# Patient Record
Sex: FEMALE | Race: WHITE | NOT HISPANIC OR LATINO | Employment: OTHER | URBAN - METROPOLITAN AREA
[De-identification: names, ages, dates, MRNs, and addresses within clinical notes are randomized per-mention and may not be internally consistent; named-entity substitution may affect disease eponyms.]

---

## 2020-05-14 ENCOUNTER — VIRTUAL VISIT (OUTPATIENT)
Dept: PHYSICAL THERAPY | Facility: CLINIC | Age: 60
End: 2020-05-14
Payer: COMMERCIAL

## 2020-05-14 DIAGNOSIS — M75.02 ADHESIVE CAPSULITIS OF LEFT SHOULDER: Primary | ICD-10-CM

## 2020-05-14 PROCEDURE — 97161 PT EVAL LOW COMPLEX 20 MIN: CPT | Mod: GP | Performed by: PHYSICAL THERAPIST

## 2020-05-14 PROCEDURE — 97110 THERAPEUTIC EXERCISES: CPT | Mod: GP | Performed by: PHYSICAL THERAPIST

## 2020-05-14 NOTE — PROGRESS NOTES
"Physical Therapy Virtual Initial Visit      The patient has been notified of following:     \"This virtual visit will be conducted between you and your provider. We have found that certain health care needs can be provided without the need for physical presence.  This service lets us provide the care you need with a virtual visit.\"    Due to external, as well as internal [facility] management of the COVID-19 Virus, Alicia Davies was not seen in our clinic.  As a substitution, we implemented a virtual visit to manage this patient's condition utilizing the [employer] virtual visit platform via the patient s existing code.  The provider, Will Bass, reviewed the patient's chart, PTRx prescription, and spoke with the patient to determine the following telemedicine visit is appropriate and effective for the patient's care.    The following type of visit was completed:   Video Visit:  The [employer] platform uses a synchronous HIPAA compliant video stream for this patient encounter.         Subjective:  The history is provided by the patient. No  was used.   Patient Health History  Alicia Davies being seen for L shoudler pain .     Problem began: 1/25/2020.   Problem occurred: fall at ski lift onto outstretch arm    Pain is reported as 5/10 on pain scale.  General health as reported by patient is good.  Pertinent medical history includes: none.   Red flags:  None as reported by patient.                                              Objective:    Standing Alignment:    Cervical/Thoracic:  Forward head  Shoulder/UE:  Rounded shoulders                  Flexibility/Screens:   Positive screens:  Cervical          Neurological: She is alert.                    Shoulder Evaluation:  ROM:  AROM:                          Extension/Internal Rotation:  Left:  L5    Right:  T4    PROM:    Flexion:  Left:  120    Right: 170          Internal Rotation:  Left:  60    Right:  90  External Rotation:  Left:  30    Right:  " 90                      Stability Testing:  not assessed      Special Tests:      Right shoulder positive for the following special tests:Impingement  Palpation:  not assessed                                       General   ROS    PTRx Content from today's visit:  Exercise Name: Cervical Retraction With Patient Overpressure, Sets: 1 - Reps: 10, Notes: 3x day  Exercise Name: Pendulum/Codmans, Sets: 1 - Reps: 10-20  Exercise Name: Shoulder Horizontal Adduction Stretch, Sets: 1 - Reps: 3-5, 10 sec hold  - Sessions: 2  Exercise Name: Four Corner Stretch External Rotation at Side, Sets: 1 - Reps: 3-5, 10 sec hold  - Sessions: 2  Exercise Name: Four Corner Stretch Flexion, Sets: 1 - Reps: 3-5, 10 sec hold - Sessions: 2    Assessment/Plan:     Patient is a 59 year old female with left side shoulder complaints.    Patient has the following significant findings with corresponding treatment plan.                Diagnosis 1:  Early Adhesive Capsulitis   Pain -  hot/cold therapy, self management, education and directional preference exercise  Decreased ROM/flexibility - manual therapy and therapeutic exercise  Decreased strength - therapeutic exercise and therapeutic activities    Therapy Evaluation Codes:   1) History comprised of:   Personal factors that impact the plan of care:      Age and Gender.    Comorbidity factors that impact the plan of care are:      None.     Medications impacting care: None.  2) Examination of Body Systems comprised of:   Body structures and functions that impact the plan of care:      Shoulder.   Activity limitations that impact the plan of care are:      Cooking, Driving, Dressing, Lifting, Working, Sleeping and Laying down.  3) Clinical presentation characteristics are:   Stable/Uncomplicated.  4) Decision-Making    Low complexity using standardized patient assessment instrument and/or measureable assessment of functional outcome.  Cumulative Therapy Evaluation is: Low complexity.    Previous  and current functional limitations:  (See Goal Flow Sheet for this information)    Short term and Long term goals: (See Goal Flow Sheet for this information)     Communication ability:  Patient appears to be able to clearly communicate and understand verbal and written communication and follow directions correctly.  Treatment Explanation - The following has been discussed with the patient:   RX ordered/plan of care  Anticipated outcomes  Possible risks and side effects  This patient would benefit from PT intervention to resume normal activities.   Rehab potential is good.    Frequency:  1 X week, everyu other week once daily  Duration:  for 12 weeks  Discharge Plan:  Achieve all LTG.  Independent in home treatment program.  Reach maximal therapeutic benefit.    Please refer to the daily flowsheet for treatment today, total treatment time and time spent performing 1:1 timed codes.       Virtual visit contact time    Time of service began: 2:30 PM  Time of service ended: 3:15 PM  Total Time for set up, visit, and documentation: 10 minutes    No coverage found.      Procedure Code/s   Therapeutic Exercise (15926): 28 minutes  Evaluation: 15 minutes    I have reviewed the note as documented above.  This accurately captures the substance of my conversation with the patient.  Provider location: [facility]  ([facility]/State)  Patient location: Home     ___________________________________________________      Goal #1  Goal #1: self cares/transfers/bed mobility  Previous Functional Level: No restrictions  Current Functional Level: Requires moderate assistance  STG Target Performance: Have sufficient UE ROM to do the following self cares independently,  do the following self cares   Rationale: for independent self care such as dressing, personal hygiene, bathing, for independent community transportation  Due Date: 05/28/20  LTG Target Performance: Have sufficient UE ROM to do the following self cares independently,  do the  following self cares ,  independently,  using both UE's  Rationale: independence in self cares, for independent community transportation, for independent living  Due Date: 07/16/20

## 2020-05-14 NOTE — LETTER
"[facility]  [id number]  [address]  [place] [address]  [phone]    May 15, 2020    Re: Alicia Davies   :   1960  MRN:  [id number]   REFERRING PHYSICIAN:   Filemon LAZAROM [place] [facility]    Date of Initial Evaluation: 2020  Visits:  Rxs Used: 1  Reason for Referral:  Adhesive capsulitis of left shoulder    EVALUATION SUMMARY    Physical Therapy Virtual Initial Visit      The patient has been notified of following:     \"This virtual visit will be conducted between you and your provider. We have found that certain health care needs can be provided without the need for physical presence.  This service lets us provide the care you need with a virtual visit.\"    Due to external, as well as internal [facility] management of the COVID-19 Virus, Alicia Davies was not seen in our clinic.  As a substitution, we implemented a virtual visit to manage this patient's condition utilizing the "[employer] virtual visit platform via the patient s existing code.  The provider, Will Bass, reviewed the patient's chart, PTRx prescription, and spoke with the patient to determine the following telemedicine visit is appropriate and effective for the patient's care.    The following type of visit was completed:   Video Visit:  The "[employer] platform uses a synchronous HIPAA compliant video stream for this patient encounter.         Subjective:  The history is provided by the patient. No  was used.   Patient Health History  Alicia Davies being seen for L shoudler pain .     Problem began: 2020.   Problem occurred: fall at ski lift onto outstretch arm    Pain is reported as 5/10 on pain scale.  General health as reported by patient is good.  Pertinent medical history includes: none.   Red flags:  None as reported by patient.       Alicia Davies   :   1960       Objective:    Standing Alignment:    Cervical/Thoracic:  Forward head  Shoulder/UE:  Rounded " shoulders  Flexibility/Screens:   Positive screens:  Cervical  Neurological: She is alert.   Shoulder Evaluation:  ROM:  AROM:    Extension/Internal Rotation:  Left:  L5    Right:  T4    PROM:    Flexion:  Left:  120    Right: 170    Internal Rotation:  Left:  60    Right:  90  External Rotation:  Left:  30    Right:  90  Stability Testing:  not assessed  Special Tests:    Right shoulder positive for the following special tests:Impingement  Palpation:  not assessed        PTRx Content from today's visit:  Exercise Name: Cervical Retraction With Patient Overpressure, Sets: 1 - Reps: 10, Notes: 3x day  Exercise Name: Pendulum/Codmans, Sets: 1 - Reps: 10-20  Exercise Name: Shoulder Horizontal Adduction Stretch, Sets: 1 - Reps: 3-5, 10 sec hold  - Sessions: 2  Exercise Name: Four Corner Stretch External Rotation at Side, Sets: 1 - Reps: 3-5, 10 sec hold  - Sessions: 2  Exercise Name: Four Corner Stretch Flexion, Sets: 1 - Reps: 3-5, 10 sec hold - Sessions: 2    Assessment/Plan:     Patient is a 59 year old female with left side shoulder complaints.    Patient has the following significant findings with corresponding treatment plan.                Diagnosis 1:  Early Adhesive Capsulitis   Pain -  hot/cold therapy, self management, education and directional preference exercise  Decreased ROM/flexibility - manual therapy and therapeutic exercise  Decreased strength - therapeutic exercise and therapeutic activitie    Previous and current functional limitations:  (See Goal Flow Sheet for this information)    Short term and Long term goals: (See Goal Flow Sheet for this information)     Communication ability:  Patient appears to be able to clearly communicate and understand verbal and written communication and follow directions correctly.  Treatment Explanation - The following has been discussed with the patient:   RX ordered/plan of care  Anticipated outcomes   Alicia Davies   :   1960        Possible risks and side  effects  This patient would benefit from PT intervention to resume normal activities.   Rehab potential is good.    Frequency:  1 X week, everyu other week once daily  Duration:  for 12 weeks  Discharge Plan:  Achieve all LTG.  Independent in home treatment program.  Reach maximal therapeutic benefit.    Please refer to the daily flowsheet for treatment today, total treatment time and time spent performing 1:1 timed codes.       Virtual visit contact time    Time of service began: 2:30 PM  Time of service ended: 3:15 PM  Total Time for set up, visit, and documentation: 10 minutes    No coverage found.      Procedure Code/s   Therapeutic Exercise (52702): 28 minutes  Evaluation: 15 minutes    I have reviewed the note as documented above.  This accurately captures the substance of my conversation with the patient.  Provider location: [facility]  ([facility]/State)  Patient location: Home     ___________________________________________________      Goal #1  Goal #1: self cares/transfers/bed mobility  Previous Functional Level: No restrictions  Current Functional Level: Requires moderate assistance  STG Target Performance: Have sufficient UE ROM to do the following self cares independently,  do the following self cares   Rationale: for independent self care such as dressing, personal hygiene, bathing, for independent community transportation  Due Date: 20  LTG Target Performance: Have sufficient UE ROM to do the following self cares independently,  do the following self cares ,  independently,  using both UE's  Rationale: independence in self cares, for independent community transportation, for independent living  Due Date: 20     Alicia Davies   :   1960                Thank you for your referral.    INQUIRIES  Therapist: Will Bass, PT  [facility]  [address]  [place] [address]  Phone: [phone]  Fax: [phone]

## 2020-08-28 ENCOUNTER — THERAPY VISIT (OUTPATIENT)
Dept: PHYSICAL THERAPY | Facility: CLINIC | Age: 60
End: 2020-08-28
Payer: COMMERCIAL

## 2020-08-28 DIAGNOSIS — G89.29 CHRONIC RIGHT SHOULDER PAIN: Primary | ICD-10-CM

## 2020-08-28 DIAGNOSIS — M25.511 CHRONIC RIGHT SHOULDER PAIN: Primary | ICD-10-CM

## 2020-08-28 PROCEDURE — 97110 THERAPEUTIC EXERCISES: CPT | Mod: GT | Performed by: PHYSICAL THERAPIST

## 2020-08-28 PROCEDURE — 97164 PT RE-EVAL EST PLAN CARE: CPT | Mod: GT | Performed by: PHYSICAL THERAPIST

## 2020-08-28 NOTE — PROGRESS NOTES
DISCHARGE REPORT    Progress reporting period is from 5/14/20 to 8/28/20.       SUBJECTIVE  Subjective: Shoulder is still bothering her due to lack of ROM reaching overhead and behind the back.  Has not been working the exercises she learned back in May.  Would like to get started again and thought she should have a follow up appt    Current Pain level: 3/10.     Initial Pain level: 6/10.   Changes in function:  None  Adverse reaction to treatment or activity: None    OBJECTIVE  Objective: Overhead elevation of R shoulder is appox 130 deg.  Has classic signs of capsular immobility with her movement patterns     ASSESSMENT/PLAN  Updated problem list and treatment plan: Diagnosis 1:  Shoulder pain/adhesive capsulitis  Pain -  home program  Decreased ROM/flexibility - home program  Decreased joint mobility - home program  STG/LTGs have been met or progress has been made towards goals:  No goals set today as patient has not been working on HEP and did not plan to follow up beyond today  Assessment of Progress: The patient's condition has potential to improve.  Self Management Plans:  Patient has been instructed in a home treatment program.  I have re-evaluated this patient and find that the nature, scope, duration and intensity of the therapy is appropriate for the medical condition of the patient.  Alicia continues to require the following intervention to meet STG and LTG's:  PT intervention is no longer required to meet STG/LTG.    Recommendations:  This patient is ready to be discharged from therapy and continue their home treatment program.  Pt would like to work on her own.  Told her she can call back if she changes her mind    Please refer to the daily flowsheet for treatment today, total treatment time and time spent performing 1:1 timed codes.

## 2021-05-24 ENCOUNTER — RECORDS - HEALTHEAST (OUTPATIENT)
Dept: ADMINISTRATIVE | Facility: CLINIC | Age: 61
End: 2021-05-24

## 2021-06-02 ENCOUNTER — RECORDS - HEALTHEAST (OUTPATIENT)
Dept: ADMINISTRATIVE | Facility: CLINIC | Age: 61
End: 2021-06-02

## 2022-08-16 ENCOUNTER — TRANSFERRED RECORDS (OUTPATIENT)
Dept: HEALTH INFORMATION MANAGEMENT | Facility: CLINIC | Age: 62
End: 2022-08-16

## 2022-08-27 ENCOUNTER — TRANSFERRED RECORDS (OUTPATIENT)
Dept: HEALTH INFORMATION MANAGEMENT | Facility: CLINIC | Age: 62
End: 2022-08-27

## 2022-09-01 ENCOUNTER — TRANSFERRED RECORDS (OUTPATIENT)
Dept: HEALTH INFORMATION MANAGEMENT | Facility: CLINIC | Age: 62
End: 2022-09-01

## 2022-09-06 ENCOUNTER — TRANSFERRED RECORDS (OUTPATIENT)
Dept: HEALTH INFORMATION MANAGEMENT | Facility: CLINIC | Age: 62
End: 2022-09-06

## 2022-09-30 ENCOUNTER — OFFICE VISIT (OUTPATIENT)
Dept: PALLIATIVE MEDICINE | Facility: CLINIC | Age: 62
End: 2022-09-30
Payer: COMMERCIAL

## 2022-09-30 VITALS — DIASTOLIC BLOOD PRESSURE: 87 MMHG | HEART RATE: 85 BPM | SYSTOLIC BLOOD PRESSURE: 144 MMHG

## 2022-09-30 DIAGNOSIS — R53.83 FATIGUE DUE TO OLD HEAD INJURY: Primary | ICD-10-CM

## 2022-09-30 DIAGNOSIS — I60.9 SAH (SUBARACHNOID HEMORRHAGE) (H): ICD-10-CM

## 2022-09-30 DIAGNOSIS — S06.0X9A CONCUSSION WITH LOSS OF CONSCIOUSNESS, INITIAL ENCOUNTER: ICD-10-CM

## 2022-09-30 DIAGNOSIS — S09.90XS FATIGUE DUE TO OLD HEAD INJURY: Primary | ICD-10-CM

## 2022-09-30 PROCEDURE — 99204 OFFICE O/P NEW MOD 45 MIN: CPT | Performed by: PHYSICAL MEDICINE & REHABILITATION
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
## 2022-11-01 ENCOUNTER — TRANSFERRED RECORDS (OUTPATIENT)
Dept: HEALTH INFORMATION MANAGEMENT | Facility: CLINIC | Age: 62
End: 2022-11-01

## 2022-11-02 ENCOUNTER — TRANSFERRED RECORDS (OUTPATIENT)
Dept: HEALTH INFORMATION MANAGEMENT | Facility: CLINIC | Age: 62
End: 2022-11-02

## 2022-11-09 ENCOUNTER — VIRTUAL VISIT (OUTPATIENT)
Dept: PHYSICAL MEDICINE AND REHAB | Facility: CLINIC | Age: 62
End: 2022-11-09
Payer: COMMERCIAL

## 2022-11-09 DIAGNOSIS — I60.9 SAH (SUBARACHNOID HEMORRHAGE) (H): Primary | ICD-10-CM

## 2022-11-09 PROCEDURE — 99212 OFFICE O/P EST SF 10 MIN: CPT | Mod: 95 | Performed by: PHYSICAL MEDICINE & REHABILITATION

## 2022-11-09 NOTE — NURSING NOTE
Chief Complaint   Patient presents with     Head Injury     Concussion follow up       There were no vitals filed for this visit.    There is no height or weight on file to calculate BMI.
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
## 2022-11-23 ENCOUNTER — TRANSFERRED RECORDS (OUTPATIENT)
Dept: HEALTH INFORMATION MANAGEMENT | Facility: CLINIC | Age: 62
End: 2022-11-23

## 2023-03-27 ENCOUNTER — TRANSFERRED RECORDS (OUTPATIENT)
Dept: HEALTH INFORMATION MANAGEMENT | Facility: CLINIC | Age: 63
End: 2023-03-27
Payer: COMMERCIAL

## 2023-03-29 ENCOUNTER — TRANSCRIBE ORDERS (OUTPATIENT)
Dept: OTHER | Age: 63
End: 2023-03-29

## 2023-03-29 DIAGNOSIS — H90.3 SENSORINEURAL HEARING LOSS, BILATERAL: Primary | ICD-10-CM

## 2023-03-29 DIAGNOSIS — H93.293 IMPAIRMENT OF AUDITORY DISCRIMINATION OF BOTH EARS: ICD-10-CM

## 2023-04-02 ENCOUNTER — HEALTH MAINTENANCE LETTER (OUTPATIENT)
Age: 63
End: 2023-04-02

## 2023-04-05 ENCOUNTER — TRANSFERRED RECORDS (OUTPATIENT)
Dept: HEALTH INFORMATION MANAGEMENT | Facility: CLINIC | Age: 63
End: 2023-04-05

## 2023-04-11 NOTE — TELEPHONE ENCOUNTER
FUTURE VISIT INFORMATION:      FUTURE VISIT INFORMATION:    Date: 5/11/23    Time: 2 PM    Location: [facility]  REFERRAL INFORMATION:    Referring provider:     Referring providers clinic: [facility]    Reason for visit/diagnosis:  Ref by: Marissa Mix NP//Dx:SNHL // Sched per pt //Ent AUDIO prior // [facility] location verified    RECORDS REQUESTED FROM:       Clinic name Comments Records Status Imaging Status   [facility] 3/27/23 Audio note -Marissa Mix, AuD    3/27/23 audiogram (sent to scan 4/11)  10/10/22 ENT note -Lazara Wilhelm MD    8/31/17 ENT note -Ugo Harper MD  9/5/17 ear culture- Left External Ear CE    Imaging      [facility]   Phone: [phone]  Fax: [phone] [facility]  CT head 8/1/22  -- IN PACS    [facility]  CT head 7/30/22  CT Maxillofacial 7/30/22 CE Pending  req 4/11/23, 4/18/23    PACS   [facility] Imaging MR brain 7/13/2009 Epic Pacs                        April 11, 2023 at 1:43 PM - request for images to [facility] for pushed to [facility]  4/18/23 7:41AM called [facility] to follow up on imaging req, they are behind on reqs. Took a verbal over the phone, images will be pushed before this afternoon  - Amay    You were seen today in the emergency department for right shoulder pain.  On x-ray shoulder does not have any evidence of fracture or dislocation.  It is possible that this is related to the ligaments around the shoulder joint, cold impingement syndrome.  You will have to follow-up with an orthopedic doctor whose information is in this packet for further evaluation of the shoulder, and treatment.  Please return if you have new or worsening symptoms including new numbness, tingling, worsening weakness.

## 2023-05-10 DIAGNOSIS — Z01.10 ENCOUNTER FOR HEARING TEST: Primary | ICD-10-CM

## 2023-05-11 ENCOUNTER — OFFICE VISIT (OUTPATIENT)
Dept: AUDIOLOGY | Facility: CLINIC | Age: 63
End: 2023-05-11
Payer: COMMERCIAL

## 2023-05-11 ENCOUNTER — OFFICE VISIT (OUTPATIENT)
Dept: OTOLARYNGOLOGY | Facility: CLINIC | Age: 63
End: 2023-05-11
Payer: COMMERCIAL

## 2023-05-11 ENCOUNTER — PRE VISIT (OUTPATIENT)
Dept: OTOLARYNGOLOGY | Facility: CLINIC | Age: 63
End: 2023-05-11

## 2023-05-11 VITALS
WEIGHT: 149 LBS | HEART RATE: 75 BPM | BODY MASS INDEX: 24.83 KG/M2 | TEMPERATURE: 98.3 F | DIASTOLIC BLOOD PRESSURE: 76 MMHG | HEIGHT: 65 IN | SYSTOLIC BLOOD PRESSURE: 126 MMHG | OXYGEN SATURATION: 96 %

## 2023-05-11 DIAGNOSIS — H90.3 SENSORINEURAL HEARING LOSS, BILATERAL: ICD-10-CM

## 2023-05-11 DIAGNOSIS — H90.3 SENSORINEURAL HEARING LOSS (SNHL), BILATERAL: Primary | ICD-10-CM

## 2023-05-11 DIAGNOSIS — H93.293 IMPAIRMENT OF AUDITORY DISCRIMINATION OF BOTH EARS: ICD-10-CM

## 2023-05-11 PROCEDURE — 99203 OFFICE O/P NEW LOW 30 MIN: CPT | Mod: 25 | Performed by: OTOLARYNGOLOGY

## 2023-05-11 PROCEDURE — 92504 EAR MICROSCOPY EXAMINATION: CPT | Mod: GC | Performed by: OTOLARYNGOLOGY

## 2023-05-11 PROCEDURE — 92550 TYMPANOMETRY & REFLEX THRESH: CPT | Performed by: AUDIOLOGIST

## 2023-05-11 PROCEDURE — 92557 COMPREHENSIVE HEARING TEST: CPT | Performed by: AUDIOLOGIST

## 2023-05-11 RX ORDER — ESCITALOPRAM OXALATE 10 MG/1
1 TABLET ORAL EVERY MORNING
COMMUNITY
Start: 2022-04-10

## 2023-05-11 RX ORDER — LISINOPRIL 5 MG/1
5 TABLET ORAL DAILY
COMMUNITY
Start: 2023-04-26

## 2023-05-11 ASSESSMENT — PATIENT HEALTH QUESTIONNAIRE - PHQ9: SUM OF ALL RESPONSES TO PHQ QUESTIONS 1-9: 5

## 2023-05-11 ASSESSMENT — PAIN SCALES - GENERAL: PAINLEVEL: NO PAIN (0)
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
## 2023-05-11 NOTE — PROGRESS NOTES
AUDIOLOGY REPORT     SUMMARY: Audiology visit completed. See audiogram for results.       RECOMMENDATIONS: Follow-up with ENT.     Bessie Castaneda M.A.  Audiology Doctoral Student  [place] #[id number]    I was present with the patient for the entire Audiology appointment including all procedures/testing performed by the AuD student, and agree with the student s assessment and plan as documented.       Catherine Muniz, Marlo  Audiologist  [place] License  #[id number]
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
## 2023-05-11 NOTE — PATIENT INSTRUCTIONS
1. You were seen in the ENT Clinic today by Dr. Bhat.  If you have any questions or concerns after your appointment, please call   - Option 1: ENT Clinic: [phone]   - Option 2: Lindsay (Dr. Bhat's Nurse): [phone]          Amy (Dr. Bhat's Nurse): [phone]     2.   Plan to return to clinic for cochlear implant evaluation with audiology    How to Contact Us:  Send a Latio message to your provider. Our team will respond to you via Latio. Occasionally, we will need to call you to get further information.  For urgent matters (Monday-Friday), call the ENT Clinic: [phone] and speak with a call center team member - they will route your call appropriately.   If you'd like to speak directly with a nurse, please find our contact information below. We do our best to check voicemail frequently throughout the day, and will work to call you back within 1-2 days. For urgent matters, please use the general clinic phone numbers listed above.        Lindsay EDWARDS LPN  [facility] - Otolaryngology

## 2023-05-11 NOTE — NURSING NOTE
"Chief Complaint   Patient presents with     Consult     Sensorineural hearing loss      Blood pressure 126/76, pulse 75, temperature 98.3  F (36.8  C), height 1.651 m (5' 5\"), weight 67.6 kg (149 lb), SpO2 96 %.    Raji Shaver LPN    "

## 2023-05-16 ENCOUNTER — TELEPHONE (OUTPATIENT)
Dept: AUDIOLOGY | Facility: CLINIC | Age: 63
End: 2023-05-16
Payer: COMMERCIAL

## 2023-05-16 NOTE — TELEPHONE ENCOUNTER
"CI Eval Intake    Referring Provider and clinic:  Francesca Bhat (ref to ENT by: Mick Lucio  Phone: [phone], Fax: [phone] Dx:[facility] // Sched per pt)    Have you seen any other Audiologist or ENT provider: (Audiology) - Marissa Barton @ [facility]   (ENT) - Mick @ [place] Dr. Lazara Wilhelm   - Dr. Gorge Glez [place] [facility]    Do you wear hearing aids: yes, bilateral ~15 years    Have you ever had ear surgery: No    Have you had any imaging done of your head:  Unknown, \"thinks she had MRI - would be long time ago [facility] for Meniere's disease\"    Have you had an Audiogram done in the last 6 months: Yes 5/11/23, [facility] prior to ENT    What is the best way to contact you- Phone, Mychart or email?  Phone (does not prefer Mychart- takes too much time)    Is there someone we can contact on your behalf for communications assistance?:  Self    If we need you to sign a release of information to obtain your records would you prefer that be mailed to you or emailed?    Paper mail, address confirmed    "

## 2023-05-30 NOTE — TELEPHONE ENCOUNTER
FUTURE VISIT INFORMATION      FUTURE VISIT INFORMATION:    Date: 6/22/23    Time: 7:00am    [place] [facility] [place]ERRAL INFORMATION:    Referring provider:  Home    Referring providers clinic:  [facility]    Reason for visit/diagnosis  CIE    RECORDS REQUESTED FROM:       Clinic name Comments Records Status Imaging Status   [facility] OV/referral 5/11/23  Audio 5/11/23, 3/27/23 EPIC

## 2023-06-22 ENCOUNTER — OFFICE VISIT (OUTPATIENT)
Dept: AUDIOLOGY | Facility: CLINIC | Age: 63
End: 2023-06-22
Payer: COMMERCIAL

## 2023-06-22 ENCOUNTER — PRE VISIT (OUTPATIENT)
Dept: AUDIOLOGY | Facility: CLINIC | Age: 63
End: 2023-06-22

## 2023-06-22 DIAGNOSIS — H90.3 SENSORINEURAL HEARING LOSS (SNHL), BILATERAL: Primary | ICD-10-CM

## 2023-06-22 PROCEDURE — 92626 EVAL AUD FUNCJ 1ST HOUR: CPT | Performed by: AUDIOLOGIST

## 2023-06-22 NOTE — PROGRESS NOTES
AUDIOLOGY REPORT    SUBJECTIVE:     Alicia SCHREIBER Young 62 year old female  was seen in Audiology at [facility], on 6/22/2023 to assess audiologic candidacy for a cochlear implant, which was ordered by Francesca Bhat M.D. Alicia has a diagnosis of  moderate sloping to profound sensorineural hearing loss in the right ear, and a moderate sloping to profound sensorineural hearing loss in the left ear.       Abuse Screening:  Do you feel unsafe at home or work/school? No  Do you feel threatened by someone? No  Does anyone try to keep you from having contact with others, or doing things outside of your home? No  Physical signs of abuse present? No      Onset of hearing loss: Unsure although likely between 45-50 years old  Identification of hearing loss: 40-50 years old  Etiology of hearing loss: Originally she was diagnosed with Meniere's disease, however no longer has that diagnosis. Likely environmental factors and heredity per patient  Sudden or Progressive: Progressive  Age Hearing Aid fit: ~48 years old  Duration of Hearing Aid use: Consistent since first fit  Current Hearing Aid Type: Signia RICs with custom molds  Age of current Hearing Aid: ~3 years  Tinnitus: Absent  Balance: No current issues, however had some dizziness when she first noticed hearing her hearing loss.  Other:  High blood pressure  Does patient exhibit intelligible vocalizations?  Yes  Primary Mode of Communication: Oral/aural/lipreading   Previous Surgeries: Will be having hand surgery for a fractured hand next week  Previous Ear Surgeries: N/A    OBJECTIVE:    To evaluate candidacy for cochlear implant. Candidacy requires at least a moderate to profound sensorineural hearing loss in both ears in most cases; good general health; lack of benefit from conventional amplification as determined from the tests below,:psychological/emotional stability and motivationally suitable, with realistic expectations, and absence of  medical conditions contraindicating surgical intervention.     Audiometric Results (see audiogram dated 5/11/23): Moderate to profound sensorineural hearing loss bilaterally.      Otoscopy revealed ears are clear of cerumen bilaterally.     Distortion product otoacoustic emissions (DPOAEs) were not performed as equipment was not available.    Device(s) used for Aided Testing:   Right ear: Patient's personal Signia hearing aid (patient reported Phonak hearing aid did not sound as loud despite it meeting targets)  Left ear: Clinic Phonak Leena B90-UP with comply tip    Electroacoustic Test Results: Electroacoustic analysis revealed patient's hearing aids were functioning appropriately and up to 's specifications. Patient's personal right hearing aid was meeting NAL-NL1 prescriptive targets and the left was not when assessed using simulated real-ear measurements therefore clinic hearing aids were used for all testing.    35 minutes were spent assessing the patient's auditory rehabilitation status in order to determine whether conventional amplification is beneficial or whether the patient is an audiologic candidate for a cochlear implant.      Soundfield Thresholds (see audiogram): Aided thresholds in moderately-severe rising to mild sloping to moderate hearing loss range with both hearing aids.    CNC Words Test: The patient repeats 50 single syllable words, auditory only. The words are presented at 60 dB SPL (conversational level) through a recording.     Left ear aided: words: 56%, phonemes: 76%  Right ear aided: words: 8%, phonemes: 35%    AzBio Sentences Test: The patient repeats 20 sentences, auditory only.  The sentences are presented at 60 dB SPL (conversational level) delivered through a recording.       Left ear aided: 86%, +10 dB SNR: 42%  Right ear aided: 28%  Bilaterally aided: 93%, +10 dB SNR: 51%    ASSESSMENT:   Ear recommended for implantation: right. Patient is meeting FDA/Medicare  candidacy criteria and is not receiving adequate speech understanding benefit from hearing aids      We discussed FDA and Medicare candidacy guidelines and that Alicia is meeting criteria. Other items discussed included:    -Cochlear implant systems including the internal and external components; how cochlear implants work and function differently than hearing aids and the the differences between acoustic and electric hearing.     -The cochlear implantation process including pre-surgical visits and extensive follow-up programming appointments.     -Realistic expectations. Relearning to hear will be a slow process as sound is different from acoustic hearing; time, practice, and programming is needed to optimize sound quality and speech understanding; hearing may never be ideal or as expected; and phone use, music appreciation; and understanding in background noise may not be limited. Alicia had very specific questions about whether or not people do worse with implants after and wanted specific percentages of how often that happens. We discussed that this is not a percentage that is tracked as performance after is not a stagnant measurement and outcomes vary depending on pre-implantation scores, hearing history, anatomy etc. I assured her that majority of patients report subjective and objective benefit after implantation and we would not recommend she move forward if we did not think that she would do better with an implant than her hearing aid.    -Risk factors:   Internal device failure; damage to the vestibular system and/or facial nerve; infection; possible loss of residual hearing on the implanted ear (although that should not affect performance outcomes); and all other risks reviewed on the Pre-Cochlear Implant Counseling handout.     Through patient interview during the consultation process this patient demonstrated the cognitive ability to use auditory clues and a willingness to undergo an extended program of  rehabilitation      PLAN: Alicia has been diagnosed with a bilateral sensorineural hearing loss and is a candidate for cochlear implantation in the right ear ear. It is recommended that they return to complete the remainder of the team evaluation; CT scan, surgeon visit, and device selection.}  Please contact the clinic at [phone] with questions regarding these results or recommendations.    Liliam Denton, [facility]  Licensed Audiologist  [place] License #[id number]

## 2023-07-07 ENCOUNTER — TELEPHONE (OUTPATIENT)
Dept: AUDIOLOGY | Facility: CLINIC | Age: 63
End: 2023-07-07
Payer: COMMERCIAL

## 2023-07-07 NOTE — TELEPHONE ENCOUNTER
Called to schedule NCO and device selection with Whit Tay (no new imaging needed per Dr. Bhat).    Gave direct # for callback.    -Maira MOHAN 7/7/23

## 2023-07-13 NOTE — TELEPHONE ENCOUNTER
2nd attempt    LVM for patient that Dr. Bhat needs to see her back to discuss specifics of CI surgery, as well as for a device selection appt with Catherine Tay. Noted we are booking into Mid August - please callback to schedule.    -Maira MOHAN  7/13/23

## 2023-07-17 ENCOUNTER — TRANSFERRED RECORDS (OUTPATIENT)
Dept: HEALTH INFORMATION MANAGEMENT | Facility: CLINIC | Age: 63
End: 2023-07-17

## 2023-07-18 NOTE — TELEPHONE ENCOUNTER
"3rd attempt -    Alicia says she's still thinking about continuing with CI and wants time to \"weigh her options and do research\" before scheduling additional ENT w/Dr. Bhat and device selection with Catherine Tay.    Alicia asked me to give her \"at least a week\" to decide, can contact after 7/28 for follow-up.    Maira MOHAN 7/18/23  "

## 2024-06-02 ENCOUNTER — HEALTH MAINTENANCE LETTER (OUTPATIENT)
Age: 64
End: 2024-06-02

## 2025-03-17 ENCOUNTER — TRANSFERRED RECORDS (OUTPATIENT)
Dept: HEALTH INFORMATION MANAGEMENT | Facility: CLINIC | Age: 65
End: 2025-03-17
Payer: COMMERCIAL

## 2025-06-13 ENCOUNTER — LAB REQUISITION (OUTPATIENT)
Dept: LAB | Facility: CLINIC | Age: 65
End: 2025-06-13
Payer: MEDICARE

## 2025-06-13 DIAGNOSIS — E03.9 HYPOTHYROIDISM, UNSPECIFIED: ICD-10-CM

## 2025-06-13 LAB — TSH SERPL DL<=0.005 MIU/L-ACNC: 1.5 UIU/ML (ref 0.3–4.2)

## 2025-06-13 PROCEDURE — 84443 ASSAY THYROID STIM HORMONE: CPT | Mod: ORL | Performed by: FAMILY MEDICINE

## 2025-06-15 ENCOUNTER — HEALTH MAINTENANCE LETTER (OUTPATIENT)
Age: 65
End: 2025-06-15